# Patient Record
(demographics unavailable — no encounter records)

---

## 2025-03-07 NOTE — DISCUSSION/SUMMARY
[de-identified] : X-rays reviewed with the patient.  All questions answered about the fracture.  Nonsurgical fracture care.  Patient was given a long cam walking boot, toe-touch weightbearing with crutches.  I explained to him and his mother that this fracture can take 6 to 8 weeks to heal completely.  Aspirin 81 mg twice a day prescribed for DVT prophylaxis.  Continue with RICE therapy.  Activity modifications.  No driving because of the right ankle fracture.  Patient will follow-up in 2 to 3 weeks for continued nonsurgical fracture care and for new x-rays.  If anything changes, he should return to office sooner for x-rays.  They both understood and agreed to the treatment course.

## 2025-03-07 NOTE — PHYSICAL EXAM
[de-identified] : Right ankle Physical Examination:   General: Alert and oriented x3.  In no acute distress.  Pleasant in nature with a normal affect.  No apparent respiratory distress. Erythema, Warmth, Rubor: Negative Swelling: +   ROM: 1. Dorsiflexion: 10 degrees 2. Plantarflexion: 40 degrees 3. 10 degrees of subtalar motion 4. Inversion: 20 degrees 5. Eversion: 20 degrees   Tenderness to Palpation: 1. Lateral Malleolus: + 2. Medial Malleolus: Negative 3. Proximal Fibular Pain: Negative 4. Heel Pain: Negative 5. Cuboid: Negative 6. Navicular: Negative 7. Tibiotalar Joint: Negative 8. Subtalar Joint: Negative 9. Posterior Recess: Negative   Tendon Pain: 1. Achilles: Negative 2. Peroneals: Negative 3. Posterior Tibialis: Negative 4. Tibialis Anterior: Negative   Ligament Pain: 1. ATFL: + 2. CFL: Negative 3. PTFL: Negative 4. Deltoid Ligaments: Negative 5. Lis Franc Ligament: Negative   Stability: 1. Anterior Drawer: Negative 2. Posterior Drawer: Negative   Strength: 5/5 TA/GS/EHL   Pulses: 2+ DP/PT Pulses   Neuro: Intact motor and sensory   Additional Test: 1. Calcaneal Squeeze Test: Negative 2. Syndesmosis Squeeze Test: Negative 3. Haq Test: Negative  [de-identified] : Radiology imaging reviewed in office with the patient on 3/7/2025 and I agree with the radiologist's impression below.  Procedure was performed at the [facility]  EXAM: ANKLE RT 3 VIEWS   PROCEDURE DATE: 03/06/2025   INTERPRETATION: CLINICAL INDICATION: right ankle pain  EXAM: Frontal oblique lateral right ankle from 3/6/2025 at 1858. No similar prior studies available for comparison.  IMPRESSION: Predominantly lateral distal calf and ankle soft tissue swelling.  Acute minimally offset oblique coronal plane distal fibular fracture extending to joint line level (Brewer B). No dislocations or additional fractures.  Congruent ankle mortise with smooth and intact talar dome. Preserved remaining visualized joint spaces and no joint margin erosions.  No calcaneal spurring and unremarkable appearing Achilles tendon shadow.  No lytic or blastic lesions.  --- End of Report ---       KRIS MITCHELL MD; Attending Radiologist This document has been electronically signed. Mar 6 2025 7:01PM

## 2025-03-07 NOTE — HISTORY OF PRESENT ILLNESS
[FreeTextEntry1] : 03/07/2025: BEENA BASHIR is a 20 year old male presenting to the office for an initial evaluation of a right ankle fracture which he sustained yesterday after he slipped down 1 step and inverted his ankle.  He went to the urgent care where x-rays were taken and he was diagnosed with a right ankle fracture.  His pain scale is 3 out of 10.  He is currently on crutches.  He presents with his mom in office today.  No other complaints.

## 2025-03-21 NOTE — ADDENDUM
[FreeTextEntry1] : I, Seth Dave, acted solely as a scribe for Dr. Ken Hamilton on this date 03/21/2025.   All medical record entries made by the Scribe were at my, Dr. Ken Hamilton, direction and personally dictated by me on 03/21/2025. I have reviewed the chart and agree that the record accurately reflects my personal performance of the history, physical exam, assessment and plan. I have also personally directed, reviewed, and agreed with the chart.

## 2025-03-21 NOTE — DISCUSSION/SUMMARY
[de-identified] : Today I had a lengthy discussion with the patient regarding their right ankle pain. I have addressed all the patient's concerns surrounding the pathology of their condition. I have reviewed the patient's XR imaging with them in great detail. The patient will continue with conservative treatments.  Assessment: Brewer B fracture    Plan:  1. I recommend that the patient utilize ice, NSAIDS/Tylenol PRN, and heat. They can also elevate their RLE above the level of the heart. 2. Activity modification was discussed in great detail. He may gently weight bear with the CAM boot as tolerated. Otherwise, he will remain NWB in his crutches.  3. Continue with aspirin for DVT prophylaxis.   The patient understood and verbally agreed to the treatment plan. All of their questions were answered, and they were satisfied with the visit. The patient should call the office if they have any questions or experience worsening symptoms.  Closed fracture care  f/u in 3-4 weeks

## 2025-03-21 NOTE — PHYSICAL EXAM
[de-identified] : Right ankle Physical Examination:   General: Alert and oriented x3.  In no acute distress.  Pleasant in nature with a normal affect.  No apparent respiratory distress. Erythema, Warmth, Rubor: Negative Swelling: +   ROM: 1. Dorsiflexion: 10 degrees 2. Plantarflexion: 40 degrees 3. 10 degrees of subtalar motion 4. Inversion: 20 degrees 5. Eversion: 20 degrees   Tenderness to Palpation: 1. Lateral Malleolus: + 2. Medial Malleolus: Negative 3. Proximal Fibular Pain: Negative 4. Heel Pain: Negative 5. Cuboid: Negative 6. Navicular: Negative 7. Tibiotalar Joint: Negative 8. Subtalar Joint: Negative 9. Posterior Recess: Negative   Tendon Pain: 1. Achilles: Negative 2. Peroneals: Negative 3. Posterior Tibialis: Negative 4. Tibialis Anterior: Negative   Ligament Pain: 1. ATFL: + 2. CFL: Negative 3. PTFL: Negative 4. Deltoid Ligaments: Negative 5. Lis Franc Ligament: Negative   Stability: 1. Anterior Drawer: Negative 2. Posterior Drawer: Negative   Strength: 5/5 TA/GS/EHL   Pulses: 2+ DP/PT Pulses   Neuro: Intact motor and sensory   Additional Test: 1. Calcaneal Squeeze Test: Negative 2. Syndesmosis Squeeze Test: Negative 3. Haq Test: Negative  [de-identified] : 3V of the right ankle were ordered, obtained and reviewed by me today, 03/21/2025, and revealed: Brewer B fracture. Fracture is stable.  Radiology imaging reviewed in office with the patient on 3/7/2025 and I agree with the radiologist's impression below.  Procedure was performed at the [facility]  EXAM: ANKLE RT 3 VIEWS   PROCEDURE DATE: 03/06/2025   INTERPRETATION: CLINICAL INDICATION: right ankle pain  EXAM: Frontal oblique lateral right ankle from 3/6/2025 at 1858. No similar prior studies available for comparison.  IMPRESSION: Predominantly lateral distal calf and ankle soft tissue swelling.  Acute minimally offset oblique coronal plane distal fibular fracture extending to joint line level (Brewer B). No dislocations or additional fractures.  Congruent ankle mortise with smooth and intact talar dome. Preserved remaining visualized joint spaces and no joint margin erosions.  No calcaneal spurring and unremarkable appearing Achilles tendon shadow.  No lytic or blastic lesions.  --- End of Report ---       KRIS MITCHELL MD; Attending Radiologist This document has been electronically signed. Mar 6 2025 7:01PM

## 2025-04-11 NOTE — HISTORY OF PRESENT ILLNESS
[FreeTextEntry1] : 04/11/2025: BEENA BASHIR is a 20 year old male presenting to the office for a follow up evaluation of a right ankle Brewer B fracture.  The patient has no pain right ankle today.  He continues to use crutches in his cam boot.  Overall, he states he is doing well.  03/21/2025: BEENA BASHIR is a 20 year old male presenting to the office with his girlfriend for a follow up evaluation of a right ankle Brewer B fracture. His symptoms have improved since last visit. He uses aspirin for DVT prophylaxis. The patient presents to the office NWB wearing a CAM boot and ambulating with crutches.  03/07/2025: BEENA BASHIR is a 20 year old male presenting to the office for an initial evaluation of a right ankle fracture which he sustained yesterday after he slipped down 1 step and inverted his ankle.  He went to the urgent care where x-rays were taken and he was diagnosed with a right ankle fracture.  His pain scale is 3 out of 10.  He is currently on crutches.  He presents with his mom in office today.  No other complaints.

## 2025-04-11 NOTE — DISCUSSION/SUMMARY
[de-identified] : Overall, the patient is doing really well.  He has no pain over the fracture.  He can now come off the crutches and fully weight-bear with the boot on.  In 1 to 2 weeks, with the help from his physical therapist, he will transition out of the boot and into an ASO brace which was provided to the patient today in office.  I did reiterate that this fracture can take 2 months to heal completely.  Activity modifications remain in effect.  Physical therapy prescription given for closed fracture care and transition phase.  Continue with the aspirin until he is out of the boot completely.  Patient will follow-up in 4 to 6 weeks.  All questions answered.   The patient understood and verbally agreed to the treatment plan. All of their questions were answered, and they were satisfied with the visit. The patient should call the office if they have any questions or experience worsening symptoms.

## 2025-04-11 NOTE — PHYSICAL EXAM
[de-identified] : Right ankle Physical Examination:   General: Alert and oriented x3.  In no acute distress.  Pleasant in nature with a normal affect.  No apparent respiratory distress. Erythema, Warmth, Rubor: Negative Swelling: Negative   ROM: 1. Dorsiflexion: 10 degrees 2. Plantarflexion: 40 degrees 3. 10 degrees of subtalar motion 4. Inversion: 20 degrees 5. Eversion: 20 degrees   Tenderness to Palpation: 1. Lateral Malleolus: The patient has no pain when palpating the lateral malleolus today on physical examination. 2. Medial Malleolus: Negative 3. Proximal Fibular Pain: Negative 4. Heel Pain: Negative 5. Cuboid: Negative 6. Navicular: Negative 7. Tibiotalar Joint: Negative 8. Subtalar Joint: Negative 9. Posterior Recess: Negative   Tendon Pain: 1. Achilles: Negative 2. Peroneals: Negative 3. Posterior Tibialis: Negative 4. Tibialis Anterior: Negative   Ligament Pain: 1. ATFL: + 2. CFL: Negative 3. PTFL: Negative 4. Deltoid Ligaments: Negative 5. Lis Franc Ligament: Negative   Stability: 1. Anterior Drawer: Negative 2. Posterior Drawer: Negative   Strength: 5/5 TA/GS/EHL   Pulses: 2+ DP/PT Pulses   Neuro: Intact motor and sensory   Additional Test: 1. Calcaneal Squeeze Test: Negative 2. Syndesmosis Squeeze Test: Negative 3. Haq Test: Negative  [de-identified] : 3V of the right ankle were ordered, obtained and reviewed by me today, 4/11/2025, and revealed: Brewer B fracture. Fracture is stable.  There is good healing response in the fracture.

## 2025-05-14 NOTE — PHYSICAL EXAM
[de-identified] : Right ankle Physical Examination:   General: Alert and oriented x3.  In no acute distress.  Pleasant in nature with a normal affect.  No apparent respiratory distress. Erythema, Warmth, Rubor: Negative Swelling: Negative   ROM: 1. Dorsiflexion: 10 degrees 2. Plantarflexion: 40 degrees 3. 10 degrees of subtalar motion 4. Inversion: 20 degrees 5. Eversion: 20 degrees   Tenderness to Palpation: 1. Lateral Malleolus: The patient has no pain when palpating the lateral malleolus today on physical examination. 2. Medial Malleolus: Negative 3. Proximal Fibular Pain: Negative 4. Heel Pain: Negative 5. Cuboid: Negative 6. Navicular: Negative 7. Tibiotalar Joint: Negative 8. Subtalar Joint: Negative 9. Posterior Recess: Negative   Tendon Pain: 1. Achilles: Negative 2. Peroneals: Negative 3. Posterior Tibialis: Negative 4. Tibialis Anterior: Negative   Ligament Pain: 1. ATFL: + 2. CFL: Negative 3. PTFL: Negative 4. Deltoid Ligaments: Negative 5. Lis Franc Ligament: Negative   Stability: 1. Anterior Drawer: Negative 2. Posterior Drawer: Negative   Strength: 5/5 TA/GS/EHL   Pulses: 2+ DP/PT Pulses   Neuro: Intact motor and sensory   Additional Test: 1. Calcaneal Squeeze Test: Negative 2. Syndesmosis Squeeze Test: Negative 3. Haq Test: Negative  [de-identified] : 3V of the right ankle were ordered, obtained and reviewed by me today, 5/14/2025, and revealed: Brewer B fracture. Fracture is stable.  There is good healing response in the fracture.

## 2025-05-14 NOTE — HISTORY OF PRESENT ILLNESS
[FreeTextEntry1] :  05/14/2025: BEENA BASHIR is a 20 year old male presenting to the office for a follow up evaluation of a right ankle Brewer B fracture.  His symptoms have improved since his last visit to the office.  The patient presents to the office in sneakers and ambulating without assistance.  04/11/2025: BEENA BASHIR is a 20 year old male presenting to the office for a follow up evaluation of a right ankle Brewer B fracture.  The patient has no pain right ankle today.  He continues to use crutches in his cam boot.  Overall, he states he is doing well.  03/21/2025: BEENA BASHIR is a 20 year old male presenting to the office with his girlfriend for a follow up evaluation of a right ankle Brewer B fracture. His symptoms have improved since last visit. He uses aspirin for DVT prophylaxis. The patient presents to the office NWB wearing a CAM boot and ambulating with crutches.  03/07/2025: BEENA BASHIR is a 20 year old male presenting to the office for an initial evaluation of a right ankle fracture which he sustained yesterday after he slipped down 1 step and inverted his ankle.  He went to the urgent care where x-rays were taken and he was diagnosed with a right ankle fracture.  His pain scale is 3 out of 10.  He is currently on crutches.  He presents with his mom in office today.  No other complaints.

## 2025-05-14 NOTE — ADDENDUM
[FreeTextEntry1] : I, Ken Al, acted solely as a scribe for Dr. Ken Hamilton on this date 05/14/2025  .   All medical record entries made by the Scribe were at my, Dr. Ken Hamilton, direction and personally dictated by me on 05/14/2025 . I have reviewed the chart and agree that the record accurately reflects my personal performance of the history, physical exam, assessment and plan. I have also personally directed, reviewed, and agreed with the chart.